# Patient Record
Sex: MALE | Race: BLACK OR AFRICAN AMERICAN | NOT HISPANIC OR LATINO | URBAN - METROPOLITAN AREA
[De-identification: names, ages, dates, MRNs, and addresses within clinical notes are randomized per-mention and may not be internally consistent; named-entity substitution may affect disease eponyms.]

---

## 2023-09-05 ENCOUNTER — WEB ENCOUNTER (OUTPATIENT)
Dept: URBAN - METROPOLITAN AREA CLINIC 27 | Facility: CLINIC | Age: 28
End: 2023-09-05

## 2023-09-05 ENCOUNTER — LAB OUTSIDE AN ENCOUNTER (OUTPATIENT)
Dept: URBAN - METROPOLITAN AREA CLINIC 27 | Facility: CLINIC | Age: 28
End: 2023-09-05

## 2023-09-05 ENCOUNTER — DASHBOARD ENCOUNTERS (OUTPATIENT)
Age: 28
End: 2023-09-05

## 2023-09-05 ENCOUNTER — OFFICE VISIT (OUTPATIENT)
Dept: URBAN - METROPOLITAN AREA CLINIC 27 | Facility: CLINIC | Age: 28
End: 2023-09-05
Payer: COMMERCIAL

## 2023-09-05 VITALS
DIASTOLIC BLOOD PRESSURE: 95 MMHG | HEART RATE: 75 BPM | HEIGHT: 69 IN | SYSTOLIC BLOOD PRESSURE: 139 MMHG | WEIGHT: 189 LBS | BODY MASS INDEX: 27.99 KG/M2

## 2023-09-05 DIAGNOSIS — M94.0 COSTOCHONDRITIS: ICD-10-CM

## 2023-09-05 DIAGNOSIS — K92.0 HEMATEMESIS: ICD-10-CM

## 2023-09-05 DIAGNOSIS — K21.9 GERD: ICD-10-CM

## 2023-09-05 DIAGNOSIS — R07.89 OTHER CHEST PAIN: ICD-10-CM

## 2023-09-05 PROCEDURE — 99244 OFF/OP CNSLTJ NEW/EST MOD 40: CPT | Performed by: INTERNAL MEDICINE

## 2023-09-05 PROCEDURE — 99204 OFFICE O/P NEW MOD 45 MIN: CPT | Performed by: INTERNAL MEDICINE

## 2023-09-05 RX ORDER — METHYLPREDNISOLONE 4 MG/1
5 PILLS ON DAY 1, 4 PILLS DAY 2, 3 PILLS DAY 3, 2 PILLS DAY 4 AND 1 PILL DAY 5 TABLET ORAL ONCE A DAY
Qty: 15 | Refills: 0 | OUTPATIENT
Start: 2023-09-05

## 2023-09-05 NOTE — HPI-TODAY'S VISIT:
This is a 27-year-old male seen as a new patient in consultation at the request of Dr. Rodrigues for chest pain.  This is new onset over the past 3 weeks of left chest discomfort that can be severe.  Associated dizziness.  He went to the emergency room and had a chest CT which was normal cardiac work-up was unrevealing.  He was told it was anxiety.  He went to follow-up with a cardiologist which thought maybe this was pericarditis versus reflux.  He was tried on colchicine which did not help and he also was taking omeprazole which did not help.  He has had a couple episodes of vomiting which she sees blood.  Although his CBC was normal and CMP was basically normal.  He feels the pain radiates into his left arm with tingling.  He will have some shortness of breath and lightheadedness.  He feels weak.  He complains of chest discomfort both on the right and left.  This is all new.  He has lost about 10 pounds during this period he also complains of epigastric discomfort.

## 2023-09-06 ENCOUNTER — WEB ENCOUNTER (OUTPATIENT)
Dept: URBAN - METROPOLITAN AREA CLINIC 27 | Facility: CLINIC | Age: 28
End: 2023-09-06

## 2023-09-06 ENCOUNTER — CLAIMS CREATED FROM THE CLAIM WINDOW (OUTPATIENT)
Dept: URBAN - METROPOLITAN AREA CLINIC 4 | Facility: CLINIC | Age: 28
End: 2023-09-06
Payer: COMMERCIAL

## 2023-09-06 ENCOUNTER — CLAIMS CREATED FROM THE CLAIM WINDOW (OUTPATIENT)
Dept: URBAN - METROPOLITAN AREA SURGERY CENTER 7 | Facility: SURGERY CENTER | Age: 28
End: 2023-09-06
Payer: COMMERCIAL

## 2023-09-06 DIAGNOSIS — K22.89 DILATATION OF ESOPHAGUS: ICD-10-CM

## 2023-09-06 DIAGNOSIS — K29.60 ADENOPAPILLOMATOSIS GASTRICA: ICD-10-CM

## 2023-09-06 DIAGNOSIS — K29.81 DUODENITIS WITH BLEEDING: ICD-10-CM

## 2023-09-06 DIAGNOSIS — K29.80 PEPTIC DUODENITIS: ICD-10-CM

## 2023-09-06 DIAGNOSIS — K29.50 CHRONIC GASTRITIS: ICD-10-CM

## 2023-09-06 DIAGNOSIS — K29.80 ACUTE DUODENITIS: ICD-10-CM

## 2023-09-06 PROCEDURE — 43239 EGD BIOPSY SINGLE/MULTIPLE: CPT | Performed by: INTERNAL MEDICINE

## 2023-09-06 PROCEDURE — G8907 PT DOC NO EVENTS ON DISCHARG: HCPCS | Performed by: INTERNAL MEDICINE

## 2023-09-06 PROCEDURE — 88342 IMHCHEM/IMCYTCHM 1ST ANTB: CPT | Performed by: PATHOLOGY

## 2023-09-06 PROCEDURE — 88305 TISSUE EXAM BY PATHOLOGIST: CPT | Performed by: PATHOLOGY

## 2023-09-06 PROCEDURE — 00731 ANES UPR GI NDSC PX NOS: CPT

## 2023-09-06 RX ORDER — METHYLPREDNISOLONE 4 MG/1
5 PILLS ON DAY 1, 4 PILLS DAY 2, 3 PILLS DAY 3, 2 PILLS DAY 4 AND 1 PILL DAY 5 TABLET ORAL ONCE A DAY
Qty: 15 | Refills: 0 | Status: ACTIVE | COMMUNITY
Start: 2023-09-05

## 2023-09-13 ENCOUNTER — WEB ENCOUNTER (OUTPATIENT)
Dept: URBAN - METROPOLITAN AREA SURGERY CENTER 7 | Facility: SURGERY CENTER | Age: 28
End: 2023-09-13

## 2023-09-21 ENCOUNTER — OFFICE VISIT (OUTPATIENT)
Dept: URBAN - METROPOLITAN AREA SURGERY CENTER 7 | Facility: SURGERY CENTER | Age: 28
End: 2023-09-21